# Patient Record
Sex: FEMALE | Race: OTHER | NOT HISPANIC OR LATINO
[De-identification: names, ages, dates, MRNs, and addresses within clinical notes are randomized per-mention and may not be internally consistent; named-entity substitution may affect disease eponyms.]

---

## 2017-01-03 ENCOUNTER — TRANSCRIPTION ENCOUNTER (OUTPATIENT)
Age: 27
End: 2017-01-03

## 2017-11-11 ENCOUNTER — TRANSCRIPTION ENCOUNTER (OUTPATIENT)
Age: 27
End: 2017-11-11

## 2017-12-05 ENCOUNTER — EMERGENCY (EMERGENCY)
Facility: HOSPITAL | Age: 27
LOS: 1 days | Discharge: HOME | End: 2017-12-05
Attending: EMERGENCY MEDICINE | Admitting: EMERGENCY MEDICINE
Payer: OTHER MISCELLANEOUS

## 2017-12-05 VITALS
RESPIRATION RATE: 16 BRPM | TEMPERATURE: 98 F | HEART RATE: 81 BPM | OXYGEN SATURATION: 98 % | SYSTOLIC BLOOD PRESSURE: 129 MMHG | DIASTOLIC BLOOD PRESSURE: 87 MMHG

## 2017-12-05 PROCEDURE — 12001 RPR S/N/AX/GEN/TRNK 2.5CM/<: CPT | Mod: F6

## 2017-12-05 PROCEDURE — 99053 MED SERV 10PM-8AM 24 HR FAC: CPT

## 2017-12-05 PROCEDURE — 99282 EMERGENCY DEPT VISIT SF MDM: CPT | Mod: 25

## 2017-12-05 NOTE — ED PROVIDER NOTE - OBJECTIVE STATEMENT
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 Pt notes the microtome blade she was cut on was clean. She has been able to move the finger and has not noticed any numbness or weakness. She irrigated the wound copiously with her , and was able to apply a clean dressing and achieve relative hemostasis. She is not light headed, has no history of coagulation or immune disorder, and has had a tetanus shot within the past 3 years. No chronic medical conditions, is treated for anxiety and depression.

## 2017-12-05 NOTE — ED ADULT TRIAGE NOTE - CHIEF COMPLAINT QUOTE
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 Pt states blade was clean.

## 2017-12-05 NOTE — ED PROVIDER NOTE - ATTENDING CONTRIBUTION TO CARE
Locurto  pt s/p laceration to volar surface  distal phalanx rt index  nailbed preserved    nl flex/ext of digit   bleeding controlled with pressure  tetanus UTD   wound closed by resident

## 2017-12-05 NOTE — ED PROVIDER NOTE - CHIEF COMPLAINT
The patient is a 27y Female complaining of The patient is a 27y Female complaining of finger laceration

## 2017-12-05 NOTE — ED PROVIDER NOTE - NS ED ROS FT
General: No fevers / chills  HENT: No head trauma, ear pain, runny nose, or sore throat  Eyes: No visual changes  CP: No chest pain, palpitations, or light headedness  Resp: No shortness of breath, no cough  GI: No abdominal pain, diarrhea, constipation, nausea, or vomiting  : No urinary fz, dysuria, or hematuria  Neuro: No numbness, tingling, or weakness  Endo: No hx of diabetes  Heme: No hx of easy bleeding or bruising

## 2017-12-05 NOTE — ED PROVIDER NOTE - PHYSICAL EXAMINATION
Gen: NAD, non-toxic, conversational  Eyes: PERRL, EOMI   HENT: Normocephalic, atraumatic. External ears normal, no rhinorrhea, moist mucous membranes.   CV: Bilateral radial pulses 2+ and symmetric. Right hand w/ cap refill <2s distal to laceration.   MSK: Focal right hand exam: Palmar aspect of second digit with linear laceration ~1 cm in length horizontal from just medial of midline to the lateral aspect of the second finger distal to the DIP joint, ~1.5 mm deep, no nailbed, bone, or tendon involvement.   Resp: speaking without difficulty on room air  Abd: soft, non tender, non rigid, no guarding or rebound tenderness  Skin: dry, wwp   Neuro: AOx3, speech is fluent and appropriate  Psych: Mood good, affect euthymic

## 2017-12-05 NOTE — ED PROVIDER NOTE - CARE PLAN
Principal Discharge DX:	Laceration of finger of right hand without foreign body, initial encounter  Instructions for follow-up, activity and diet:	Have the sutures removed in 7 days, return here to the emergency department or see your primary doctor to have this performed. Keep the area dry and clean. Stay alert for signs of infection as we discussed, such as fevers, chills, increasing pain at the laceration site, abnormal wound drainage, or any other signs of concern; if you develop these please return to the emergency department for repeat evaluation.

## 2017-12-05 NOTE — ED PROVIDER NOTE - MEDICAL DECISION MAKING DETAILS
27F presenting with isolated finger laceration, no other injuries. Finger irrigated and repaired as per procedure note. Sutures out in 7 days, instructions given for return precautions with emphasis on signs of infection and avoiding using the finger. 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 Pt discharged in improved condition.

## 2017-12-05 NOTE — ED PROVIDER NOTE - PLAN OF CARE
Have the sutures removed in 7 days, return here to the emergency department or see your primary doctor to have this performed. Keep the area dry and clean. Stay alert for signs of infection as we discussed, such as fevers, chills, increasing pain at the laceration site, abnormal wound drainage, or any other signs of concern; if you develop these please return to the emergency department for repeat evaluation.

## 2017-12-12 ENCOUNTER — EMERGENCY (EMERGENCY)
Facility: HOSPITAL | Age: 27
LOS: 1 days | Discharge: HOME | End: 2017-12-12
Attending: EMERGENCY MEDICINE | Admitting: EMERGENCY MEDICINE
Payer: OTHER MISCELLANEOUS

## 2017-12-12 VITALS
TEMPERATURE: 98 F | RESPIRATION RATE: 16 BRPM | SYSTOLIC BLOOD PRESSURE: 134 MMHG | HEART RATE: 98 BPM | DIASTOLIC BLOOD PRESSURE: 95 MMHG | OXYGEN SATURATION: 98 %

## 2017-12-12 PROCEDURE — 99053 MED SERV 10PM-8AM 24 HR FAC: CPT

## 2017-12-12 NOTE — ED PROVIDER NOTE - OBJECTIVE STATEMENT
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 Now returning 7 days later for suture removal. No fevers/chills, no drainage, no lymphangitis/streaking, pt notes finger appears well. 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 No loss of sensation, strength, or other issues with the finger. Now returning ~1 week later for suture removal. No fevers/chills, no drainage, no lymphangitis/streaking, pt notes finger appears well.

## 2017-12-12 NOTE — ED PROVIDER NOTE - PLAN OF CARE
Keep the area clean and dry. You may resume activity as tolerated. Return to the emergency department for any fevers, chills, or red streaking from the area.

## 2017-12-12 NOTE — ED PROVIDER NOTE - CARE PLAN
Principal Discharge DX:	Laceration of finger of right hand, subsequent encounter  Instructions for follow-up, activity and diet:	Keep the area clean and dry. You may resume activity as tolerated. Return to the emergency department for any fevers, chills, or red streaking from the area.

## 2017-12-12 NOTE — ED PROVIDER NOTE - MEDICAL DECISION MAKING DETAILS
27yF presenting for suture removal. No complications with suture placement or removal; no signs of infection on exam, appears well healing.

## 2017-12-12 NOTE — ED PROVIDER NOTE - NS ED ROS FT
General: No fevers / chills  HENT: No sore throat  Eyes: No visual changes  CP: No chest pain  Resp: No dyspnea  GI: No abdominal pain  : No urinary complaints  Neuro: No numbness, tingling, or weakness  Endo: No hx of diabetes  Heme: No hx of easy bleeding

## 2017-12-12 NOTE — ED PROVIDER NOTE - PHYSICAL EXAMINATION
Gen: NAD, non-toxic, conversational  Eyes: PERRL, EOMI   HENT: Normocephalic, atraumatic. External ears normal, no rhinorrhea, moist mucous membranes.   CV: RRR, no M/R/G  Resp: CTAB, non-labored, speaking without difficulty on room air  Abd: soft, non tender, non rigid, no guarding or rebound tenderness  Skin: dry, wwp, 5 stitches on horizontal laceration of distal 2nd right finger, well healed appearing, no drainage or open areas of wound.   Neuro: AOx3, speech is fluent and appropriate  Psych: Mood good, affect euthymic

## 2017-12-12 NOTE — ED PROVIDER NOTE - ATTENDING CONTRIBUTION TO CARE
26 yo F here for suture removal. Well healing with no signs of infection on exam. will d/c.  I performed a history and physical exam of the patient and discussed their management with the resident. I reviewed the resident's note and agree with the documented findings and plan of care. My medical decision making and observations are found above.

## 2018-03-14 ENCOUNTER — EMERGENCY (EMERGENCY)
Facility: HOSPITAL | Age: 28
LOS: 1 days | Discharge: HOME | End: 2018-03-14
Attending: EMERGENCY MEDICINE | Admitting: EMERGENCY MEDICINE
Payer: COMMERCIAL

## 2018-03-14 VITALS
TEMPERATURE: 98 F | OXYGEN SATURATION: 97 % | HEART RATE: 93 BPM | RESPIRATION RATE: 16 BRPM | SYSTOLIC BLOOD PRESSURE: 126 MMHG | DIASTOLIC BLOOD PRESSURE: 79 MMHG

## 2018-03-14 LAB
ALBUMIN SERPL ELPH-MCNC: 4.2 G/DL — SIGNIFICANT CHANGE UP (ref 3.3–5)
ALP SERPL-CCNC: 61 U/L — SIGNIFICANT CHANGE UP (ref 40–120)
ALT FLD-CCNC: 11 U/L — SIGNIFICANT CHANGE UP (ref 4–33)
AST SERPL-CCNC: 14 U/L — SIGNIFICANT CHANGE UP (ref 4–32)
BASOPHILS # BLD AUTO: 0.11 K/UL — SIGNIFICANT CHANGE UP (ref 0–0.2)
BASOPHILS NFR BLD AUTO: 0.8 % — SIGNIFICANT CHANGE UP (ref 0–2)
BILIRUB SERPL-MCNC: 0.2 MG/DL — SIGNIFICANT CHANGE UP (ref 0.2–1.2)
BUN SERPL-MCNC: 13 MG/DL — SIGNIFICANT CHANGE UP (ref 7–23)
CALCIUM SERPL-MCNC: 9.1 MG/DL — SIGNIFICANT CHANGE UP (ref 8.4–10.5)
CHLORIDE SERPL-SCNC: 102 MMOL/L — SIGNIFICANT CHANGE UP (ref 98–107)
CO2 SERPL-SCNC: 26 MMOL/L — SIGNIFICANT CHANGE UP (ref 22–31)
CREAT SERPL-MCNC: 0.8 MG/DL — SIGNIFICANT CHANGE UP (ref 0.5–1.3)
EOSINOPHIL # BLD AUTO: 1.24 K/UL — HIGH (ref 0–0.5)
EOSINOPHIL NFR BLD AUTO: 9.4 % — HIGH (ref 0–6)
GLUCOSE SERPL-MCNC: 79 MG/DL — SIGNIFICANT CHANGE UP (ref 70–99)
HCG SERPL-ACNC: < 5 MIU/ML — SIGNIFICANT CHANGE UP
HCT VFR BLD CALC: 44.9 % — SIGNIFICANT CHANGE UP (ref 34.5–45)
HGB BLD-MCNC: 14.6 G/DL — SIGNIFICANT CHANGE UP (ref 11.5–15.5)
IMM GRANULOCYTES # BLD AUTO: 0.05 # — SIGNIFICANT CHANGE UP
IMM GRANULOCYTES NFR BLD AUTO: 0.4 % — SIGNIFICANT CHANGE UP (ref 0–1.5)
LIDOCAIN IGE QN: 30.7 U/L — SIGNIFICANT CHANGE UP (ref 7–60)
LYMPHOCYTES # BLD AUTO: 17.3 % — SIGNIFICANT CHANGE UP (ref 13–44)
LYMPHOCYTES # BLD AUTO: 2.29 K/UL — SIGNIFICANT CHANGE UP (ref 1–3.3)
MCHC RBC-ENTMCNC: 28.2 PG — SIGNIFICANT CHANGE UP (ref 27–34)
MCHC RBC-ENTMCNC: 32.5 % — SIGNIFICANT CHANGE UP (ref 32–36)
MCV RBC AUTO: 86.7 FL — SIGNIFICANT CHANGE UP (ref 80–100)
MONOCYTES # BLD AUTO: 0.61 K/UL — SIGNIFICANT CHANGE UP (ref 0–0.9)
MONOCYTES NFR BLD AUTO: 4.6 % — SIGNIFICANT CHANGE UP (ref 2–14)
NEUTROPHILS # BLD AUTO: 8.95 K/UL — HIGH (ref 1.8–7.4)
NEUTROPHILS NFR BLD AUTO: 67.5 % — SIGNIFICANT CHANGE UP (ref 43–77)
NRBC # FLD: 0 — SIGNIFICANT CHANGE UP
PLATELET # BLD AUTO: 368 K/UL — SIGNIFICANT CHANGE UP (ref 150–400)
PMV BLD: 9.9 FL — SIGNIFICANT CHANGE UP (ref 7–13)
POTASSIUM SERPL-MCNC: 4 MMOL/L — SIGNIFICANT CHANGE UP (ref 3.5–5.3)
POTASSIUM SERPL-SCNC: 4 MMOL/L — SIGNIFICANT CHANGE UP (ref 3.5–5.3)
PROT SERPL-MCNC: 7.3 G/DL — SIGNIFICANT CHANGE UP (ref 6–8.3)
RBC # BLD: 5.18 M/UL — SIGNIFICANT CHANGE UP (ref 3.8–5.2)
RBC # FLD: 12.1 % — SIGNIFICANT CHANGE UP (ref 10.3–14.5)
SODIUM SERPL-SCNC: 141 MMOL/L — SIGNIFICANT CHANGE UP (ref 135–145)
WBC # BLD: 13.25 K/UL — HIGH (ref 3.8–10.5)
WBC # FLD AUTO: 13.25 K/UL — HIGH (ref 3.8–10.5)

## 2018-03-14 PROCEDURE — 99284 EMERGENCY DEPT VISIT MOD MDM: CPT

## 2018-03-14 PROCEDURE — 76705 ECHO EXAM OF ABDOMEN: CPT | Mod: 26

## 2018-03-14 RX ORDER — FAMOTIDINE 10 MG/ML
20 INJECTION INTRAVENOUS ONCE
Qty: 0 | Refills: 0 | Status: COMPLETED | OUTPATIENT
Start: 2018-03-14 | End: 2018-03-14

## 2018-03-14 RX ADMIN — FAMOTIDINE 20 MILLIGRAM(S): 10 INJECTION INTRAVENOUS at 12:07

## 2018-03-14 RX ADMIN — Medication 30 MILLILITER(S): at 12:07

## 2018-03-14 NOTE — ED ADULT TRIAGE NOTE - CHIEF COMPLAINT QUOTE
Pt. c/o mid-abdominal pain radiating to back x 4 days with nausea. Went to PCP on Monday who said it was probably gastritis. Given script for US but hasn't gone yet. Denies any vomiting, diarrhea or fevers. PMHx asthma, anxiety

## 2018-03-14 NOTE — ED PROVIDER NOTE - OBJECTIVE STATEMENT
Patient is a 28 year old female with no PMH presenting with epigastric pain. Symptoms began 3-4 days ago. She notes pain stays in epigastrium and feels like bloating as well. Occasionally radiates to back. No n/v/d/black or bloody stools. No trauma, fevers, etoh use. Saw PMD when symptoms began who ordered outpatient RUQ sono (not yet performed) and tums. + family history of pancreatitis. No chest pain, sob, focal weakness or numbness.

## 2018-03-14 NOTE — ED PROVIDER NOTE - MEDICAL DECISION MAKING DETAILS
pt presenting with epigastric pain, occasionally radiating to back. Mild epigastric ttp on exam. will obtain  labs, lipase, Gi cocktail, ruq sono. likely dc home if unremarkable workup

## 2018-09-07 ENCOUNTER — TRANSCRIPTION ENCOUNTER (OUTPATIENT)
Age: 28
End: 2018-09-07

## 2018-10-31 ENCOUNTER — TRANSCRIPTION ENCOUNTER (OUTPATIENT)
Age: 28
End: 2018-10-31

## 2018-11-02 ENCOUNTER — TRANSCRIPTION ENCOUNTER (OUTPATIENT)
Age: 28
End: 2018-11-02

## 2018-12-11 ENCOUNTER — TRANSCRIPTION ENCOUNTER (OUTPATIENT)
Age: 28
End: 2018-12-11

## 2019-03-29 ENCOUNTER — TRANSCRIPTION ENCOUNTER (OUTPATIENT)
Age: 29
End: 2019-03-29

## 2019-04-07 ENCOUNTER — EMERGENCY (EMERGENCY)
Facility: HOSPITAL | Age: 29
LOS: 1 days | Discharge: HOME | End: 2019-04-07
Attending: STUDENT IN AN ORGANIZED HEALTH CARE EDUCATION/TRAINING PROGRAM
Payer: COMMERCIAL

## 2019-04-07 VITALS
TEMPERATURE: 98 F | HEIGHT: 68 IN | SYSTOLIC BLOOD PRESSURE: 112 MMHG | OXYGEN SATURATION: 100 % | DIASTOLIC BLOOD PRESSURE: 71 MMHG | HEART RATE: 76 BPM | WEIGHT: 154.98 LBS | RESPIRATION RATE: 16 BRPM

## 2019-04-07 PROCEDURE — 99284 EMERGENCY DEPT VISIT MOD MDM: CPT | Mod: 25

## 2019-04-07 PROCEDURE — 71046 X-RAY EXAM CHEST 2 VIEWS: CPT

## 2019-04-07 PROCEDURE — 71046 X-RAY EXAM CHEST 2 VIEWS: CPT | Mod: 26

## 2019-04-07 RX ORDER — QUETIAPINE FUMARATE 200 MG/1
0 TABLET, FILM COATED ORAL
Qty: 0 | Refills: 0 | COMMUNITY

## 2019-04-07 RX ORDER — CYCLOBENZAPRINE HYDROCHLORIDE 10 MG/1
1 TABLET, FILM COATED ORAL
Qty: 10 | Refills: 0 | OUTPATIENT
Start: 2019-04-07

## 2019-04-07 RX ORDER — LORATADINE 10 MG/1
0 TABLET ORAL
Qty: 0 | Refills: 0 | COMMUNITY

## 2019-04-07 RX ORDER — PANTOPRAZOLE SODIUM 20 MG/1
0 TABLET, DELAYED RELEASE ORAL
Qty: 0 | Refills: 0 | COMMUNITY

## 2019-04-07 RX ORDER — ACETAMINOPHEN 500 MG
650 TABLET ORAL ONCE
Qty: 0 | Refills: 0 | Status: COMPLETED | OUTPATIENT
Start: 2019-04-07 | End: 2019-04-07

## 2019-04-07 RX ORDER — CYCLOBENZAPRINE HYDROCHLORIDE 10 MG/1
10 TABLET, FILM COATED ORAL ONCE
Qty: 0 | Refills: 0 | Status: COMPLETED | OUTPATIENT
Start: 2019-04-07 | End: 2019-04-07

## 2019-04-07 RX ORDER — SERTRALINE 25 MG/1
0 TABLET, FILM COATED ORAL
Qty: 0 | Refills: 0 | COMMUNITY

## 2019-04-07 RX ORDER — ALBUTEROL 90 UG/1
0 AEROSOL, METERED ORAL
Qty: 0 | Refills: 0 | COMMUNITY

## 2019-04-07 RX ADMIN — CYCLOBENZAPRINE HYDROCHLORIDE 10 MILLIGRAM(S): 10 TABLET, FILM COATED ORAL at 04:08

## 2019-04-07 RX ADMIN — Medication 650 MILLIGRAM(S): at 04:08

## 2019-04-07 NOTE — ED PROVIDER NOTE - CLINICAL SUMMARY MEDICAL DECISION MAKING FREE TEXT BOX
patient vital stable. neuro intact. no focal deficit. no midline back pain. no fever, neuro deficit, iv drug use. likely msk pain, will treat pain dougie

## 2019-04-07 NOTE — ED PROVIDER NOTE - OBJECTIVE STATEMENT
29 y.o presenting with intermittent back pain x 1 week. endorses 29 y.o presenting with intermittent back pain x 1 week. endorses pain to upper back. endorses worse with movement. denies fever, chills, midline back pain, weakness, numbness, cp, sob.

## 2019-09-25 ENCOUNTER — TRANSCRIPTION ENCOUNTER (OUTPATIENT)
Age: 29
End: 2019-09-25

## 2019-11-27 ENCOUNTER — TRANSCRIPTION ENCOUNTER (OUTPATIENT)
Age: 29
End: 2019-11-27

## 2020-04-07 ENCOUNTER — TRANSCRIPTION ENCOUNTER (OUTPATIENT)
Age: 30
End: 2020-04-07

## 2020-05-29 NOTE — ED ADULT NURSE NOTE - CINV DISCH MEDS REVIEWED YN
Addressed pt's concerns today and rationale explained, collaboratively discussed with pt how to proceed and reached a solution that pt feels will work best for her; pt expresses understanding and no further questions. thanks   Yes

## 2023-11-01 PROBLEM — Z00.00 ENCOUNTER FOR PREVENTIVE HEALTH EXAMINATION: Status: ACTIVE | Noted: 2023-11-01

## 2023-11-02 PROBLEM — F32.9 MAJOR DEPRESSIVE DISORDER, SINGLE EPISODE, UNSPECIFIED: Chronic | Status: ACTIVE | Noted: 2019-04-07

## 2023-11-02 PROBLEM — J45.909 UNSPECIFIED ASTHMA, UNCOMPLICATED: Chronic | Status: ACTIVE | Noted: 2019-04-07

## 2023-11-02 PROBLEM — K21.9 GASTRO-ESOPHAGEAL REFLUX DISEASE WITHOUT ESOPHAGITIS: Chronic | Status: ACTIVE | Noted: 2019-04-07

## 2023-11-02 PROBLEM — F41.9 ANXIETY DISORDER, UNSPECIFIED: Chronic | Status: ACTIVE | Noted: 2019-04-07

## 2023-11-06 ENCOUNTER — APPOINTMENT (OUTPATIENT)
Dept: INTERNAL MEDICINE | Facility: CLINIC | Age: 33
End: 2023-11-06

## 2023-11-06 ENCOUNTER — TRANSCRIPTION ENCOUNTER (OUTPATIENT)
Age: 33
End: 2023-11-06

## 2024-03-27 ENCOUNTER — NON-APPOINTMENT (OUTPATIENT)
Age: 34
End: 2024-03-27

## 2024-04-18 ENCOUNTER — APPOINTMENT (OUTPATIENT)
Dept: PULMONOLOGY | Facility: CLINIC | Age: 34
End: 2024-04-18
Payer: COMMERCIAL

## 2024-04-18 ENCOUNTER — LABORATORY RESULT (OUTPATIENT)
Age: 34
End: 2024-04-18

## 2024-04-18 VITALS
SYSTOLIC BLOOD PRESSURE: 128 MMHG | HEIGHT: 68 IN | HEART RATE: 107 BPM | OXYGEN SATURATION: 97 % | WEIGHT: 150 LBS | RESPIRATION RATE: 12 BRPM | BODY MASS INDEX: 22.73 KG/M2 | TEMPERATURE: 97.5 F | DIASTOLIC BLOOD PRESSURE: 83 MMHG

## 2024-04-18 DIAGNOSIS — F32.A ANXIETY DISORDER, UNSPECIFIED: ICD-10-CM

## 2024-04-18 DIAGNOSIS — Z82.5 FAMILY HISTORY OF ASTHMA AND OTHER CHRONIC LOWER RESPIRATORY DISEASES: ICD-10-CM

## 2024-04-18 DIAGNOSIS — F41.9 ANXIETY DISORDER, UNSPECIFIED: ICD-10-CM

## 2024-04-18 DIAGNOSIS — J45.30 MILD PERSISTENT ASTHMA, UNCOMPLICATED: ICD-10-CM

## 2024-04-18 DIAGNOSIS — R09.82 POSTNASAL DRIP: ICD-10-CM

## 2024-04-18 DIAGNOSIS — K21.9 GASTRO-ESOPHAGEAL REFLUX DISEASE W/OUT ESOPHAGITIS: ICD-10-CM

## 2024-04-18 PROCEDURE — 99203 OFFICE O/P NEW LOW 30 MIN: CPT

## 2024-04-18 RX ORDER — FAMOTIDINE 20 MG/1
20 TABLET, FILM COATED ORAL TWICE DAILY
Refills: 0 | Status: ACTIVE | COMMUNITY
Start: 2024-04-18

## 2024-04-18 RX ORDER — QUETIAPINE 100 MG/1
100 TABLET, FILM COATED ORAL
Refills: 0 | Status: ACTIVE | COMMUNITY
Start: 2024-04-18

## 2024-04-18 RX ORDER — BUDESONIDE AND FORMOTEROL FUMARATE DIHYDRATE 80; 4.5 UG/1; UG/1
80-4.5 AEROSOL RESPIRATORY (INHALATION)
Qty: 1 | Refills: 5 | Status: ACTIVE | COMMUNITY
Start: 2024-04-18 | End: 1900-01-01

## 2024-04-18 RX ORDER — ALBUTEROL SULFATE 90 UG/1
108 (90 BASE) INHALANT RESPIRATORY (INHALATION)
Qty: 1 | Refills: 3 | Status: ACTIVE | COMMUNITY
Start: 2024-04-18 | End: 1900-01-01

## 2024-04-18 RX ORDER — AZELASTINE HYDROCHLORIDE 137 UG/1
137 SPRAY, METERED NASAL TWICE DAILY
Qty: 1 | Refills: 3 | Status: ACTIVE | COMMUNITY
Start: 2024-04-18 | End: 1900-01-01

## 2024-04-18 RX ORDER — SERTRALINE HYDROCHLORIDE 100 MG/1
100 TABLET, FILM COATED ORAL DAILY
Refills: 0 | Status: ACTIVE | COMMUNITY
Start: 2024-04-18

## 2024-04-18 RX ORDER — ALBUTEROL SULFATE 1.25 MG/3ML
1.25 SOLUTION RESPIRATORY (INHALATION)
Qty: 1 | Refills: 5 | Status: ACTIVE | COMMUNITY
Start: 2024-04-18 | End: 1900-01-01

## 2024-04-18 NOTE — ASSESSMENT
[FreeTextEntry1] : Pt is a 34year old female nonsmoker with medical history of eosinophilic asthma, anxiety, depression, GERD, hiatal hernia . Pt is here for the management of asthma and to establish care.    PEF in office 300. Advised to keep log record for twice a day  Will start Symbicort 80 1-2 puffs as needed, will do albuterol nebs everday for once a week. Medication sent to [facility] Requested to do PFT for lung function capacity.   Repeat asthma profile, CBC+diff, IgE  O/V after test.

## 2024-04-18 NOTE — HISTORY OF PRESENT ILLNESS
[Never] : never [TextBox_4] : Pt is a 34year old female nonsmoker with medical history of eosinophilic asthma, anxiety, depression, GERD, hiatal hernia . Pt is here for the management of asthma and to establish care.   Recently went to the urgent care 03/28 for fever, cough. Recent exposure to covid positive. Prescribed Medrol pack, did not take mucinex and Flonase did not help. Still experiencing productive cough with yellow phlegm, nasal drip. Cough is brought on by exertion, activity and talking. Especially worse when laying flat. She admits to seasonal allergies and had allergy testing in the past. (cat dander, pollen, mold). Currently doing albuterol nebs once a week, claritin and albuterol HFA three times in the past week. Pt denies chest pain, fever, nausea, vomiting.   She is unable to do much activity feels fatigue quickly. Currently able to ambulate a block only.  She used to see Dr. Lowe Pulmonologist  ([phone]). Records scanned in the chart  Had covid in August 2023 and 2020. Pt did not get hospitalized.   Social hx  Works at a [employer] dept Has 1 dog

## 2024-04-18 NOTE — REVIEW OF SYSTEMS
[Nasal Congestion] : nasal congestion [Postnasal Drip] : postnasal drip [Sinus Problems] : sinus problems [Cough] : cough [SOB on Exertion] : sob on exertion [Seasonal Allergies] : seasonal allergies [GERD] : gerd [Negative] : Endocrine [Hemoptysis] : no hemoptysis [Sputum] : no sputum [Dyspnea] : no dyspnea

## 2024-04-18 NOTE — PHYSICAL EXAM
[No Acute Distress] : no acute distress [Normal Oropharynx] : normal oropharynx [Normal Appearance] : normal appearance [Normal Rate/Rhythm] : normal rate/rhythm [No Abnormalities] : no abnormalities [Benign] : benign [Normal Gait] : normal gait [No Clubbing] : no clubbing [Normal Color/ Pigmentation] : normal color/ pigmentation [No Focal Deficits] : no focal deficits [Oriented x3] : oriented x3 [TextBox_68] : slight wheezing on bases

## 2024-04-19 LAB
BASOPHILS # BLD AUTO: 0.09 K/UL
BASOPHILS NFR BLD AUTO: 0.9 %
EOSINOPHIL # BLD AUTO: 0.97 K/UL
EOSINOPHIL NFR BLD AUTO: 9.6 %
HCT VFR BLD CALC: 43.9 %
HGB BLD-MCNC: 13.8 G/DL
IMM GRANULOCYTES NFR BLD AUTO: 0.3 %
LYMPHOCYTES # BLD AUTO: 1.99 K/UL
LYMPHOCYTES NFR BLD AUTO: 19.6 %
MAN DIFF?: NORMAL
MCHC RBC-ENTMCNC: 29.1 PG
MCHC RBC-ENTMCNC: 31.4 GM/DL
MCV RBC AUTO: 92.6 FL
MONOCYTES # BLD AUTO: 0.61 K/UL
MONOCYTES NFR BLD AUTO: 6 %
NEUTROPHILS # BLD AUTO: 6.44 K/UL
NEUTROPHILS NFR BLD AUTO: 63.6 %
PLATELET # BLD AUTO: 344 K/UL
RBC # BLD: 4.74 M/UL
RBC # FLD: 12.5 %
WBC # FLD AUTO: 10.13 K/UL

## 2024-04-20 LAB — TOTAL IGE SMQN RAST: 271 KU/L

## 2024-04-22 LAB
A ALTERNATA IGE QN: <0.1 KUA/L
A FUMIGATUS IGE QN: <0.1 KUA/L
C ALBICANS IGE QN: <0.1 KUA/L
C HERBARUM IGE QN: <0.1 KUA/L
CAT DANDER IGE QN: 3.47 KUA/L
COMMON RAGWEED IGE QN: <0.1 KUA/L
D FARINAE IGE QN: 13.5 KUA/L
D PTERONYSS IGE QN: 12.8 KUA/L
DEPRECATED A ALTERNATA IGE RAST QL: 0 (ref 0–?)
DEPRECATED A FUMIGATUS IGE RAST QL: 0 (ref 0–?)
DEPRECATED C ALBICANS IGE RAST QL: 0
DEPRECATED C HERBARUM IGE RAST QL: 0 (ref 0–?)
DEPRECATED CAT DANDER IGE RAST QL: 2 (ref 0–?)
DEPRECATED COMMON RAGWEED IGE RAST QL: 0 (ref 0–?)
DEPRECATED D FARINAE IGE RAST QL: 3 (ref 0–?)
DEPRECATED D PTERONYSS IGE RAST QL: 3 (ref 0–?)
DEPRECATED DOG DANDER IGE RAST QL: 3 (ref 0–?)
DEPRECATED M RACEMOSUS IGE RAST QL: 0
DEPRECATED ROACH IGE RAST QL: 0 (ref 0–?)
DEPRECATED TIMOTHY IGE RAST QL: 2 (ref 0–?)
DEPRECATED WHITE OAK IGE RAST QL: 0 (ref 0–?)
DOG DANDER IGE QN: 11.2 KUA/L
M RACEMOSUS IGE QN: <0.1 KUA/L
ROACH IGE QN: <0.1 KUA/L
TIMOTHY IGE QN: 2.05 KUA/L
WHITE OAK IGE QN: <0.1 KUA/L

## 2024-05-06 ENCOUNTER — APPOINTMENT (OUTPATIENT)
Dept: PRIMARY CARE | Facility: CLINIC | Age: 34
End: 2024-05-06

## 2024-06-27 ENCOUNTER — OUTPATIENT (OUTPATIENT)
Dept: OUTPATIENT SERVICES | Facility: HOSPITAL | Age: 34
LOS: 1 days | End: 2024-06-27
Payer: COMMERCIAL

## 2024-06-27 DIAGNOSIS — J45.30 MILD PERSISTENT ASTHMA, UNCOMPLICATED: ICD-10-CM

## 2024-06-27 PROCEDURE — 94010 BREATHING CAPACITY TEST: CPT | Mod: 26

## 2024-06-27 PROCEDURE — 94726 PLETHYSMOGRAPHY LUNG VOLUMES: CPT

## 2024-06-27 PROCEDURE — 94760 N-INVAS EAR/PLS OXIMETRY 1: CPT

## 2024-06-27 PROCEDURE — 94729 DIFFUSING CAPACITY: CPT | Mod: 26

## 2024-06-27 PROCEDURE — 94060 EVALUATION OF WHEEZING: CPT

## 2024-06-27 PROCEDURE — 94726 PLETHYSMOGRAPHY LUNG VOLUMES: CPT | Mod: 26

## 2024-06-27 PROCEDURE — 94729 DIFFUSING CAPACITY: CPT

## 2024-07-18 ENCOUNTER — APPOINTMENT (OUTPATIENT)
Dept: PULMONOLOGY | Facility: CLINIC | Age: 34
End: 2024-07-18
Payer: COMMERCIAL

## 2024-07-18 VITALS
HEART RATE: 88 BPM | BODY MASS INDEX: 24.25 KG/M2 | DIASTOLIC BLOOD PRESSURE: 75 MMHG | RESPIRATION RATE: 12 BRPM | HEIGHT: 68 IN | SYSTOLIC BLOOD PRESSURE: 111 MMHG | OXYGEN SATURATION: 97 % | TEMPERATURE: 97.7 F | WEIGHT: 160 LBS

## 2024-07-18 DIAGNOSIS — J45.50 SEVERE PERSISTENT ASTHMA, UNCOMPLICATED: ICD-10-CM

## 2024-07-18 PROCEDURE — 99213 OFFICE O/P EST LOW 20 MIN: CPT

## 2024-07-22 RX ORDER — BENRALIZUMAB 30 MG/ML
30 INJECTION, SOLUTION SUBCUTANEOUS
Qty: 1 | Refills: 2 | Status: ACTIVE | COMMUNITY
Start: 2024-07-18 | End: 1900-01-01

## 2024-07-22 RX ORDER — BENRALIZUMAB 30 MG/ML
30 INJECTION, SOLUTION SUBCUTANEOUS
Qty: 1 | Refills: 1 | Status: ACTIVE | COMMUNITY
Start: 2024-07-18 | End: 1900-01-01

## 2024-07-24 ENCOUNTER — RX RENEWAL (OUTPATIENT)
Age: 34
End: 2024-07-24

## 2024-07-24 RX ORDER — BUDESONIDE AND FORMOTEROL FUMARATE DIHYDRATE 80; 4.5 UG/1; UG/1
80-4.5 AEROSOL RESPIRATORY (INHALATION) TWICE DAILY
Qty: 30.6 | Refills: 2 | Status: ACTIVE | COMMUNITY
Start: 2024-07-24 | End: 1900-01-01

## 2024-10-24 ENCOUNTER — APPOINTMENT (OUTPATIENT)
Dept: PULMONOLOGY | Facility: CLINIC | Age: 34
End: 2024-10-24

## 2025-04-25 ENCOUNTER — NON-APPOINTMENT (OUTPATIENT)
Age: 35
End: 2025-04-25

## 2025-05-12 ENCOUNTER — APPOINTMENT (OUTPATIENT)
Dept: PULMONOLOGY | Facility: CLINIC | Age: 35
End: 2025-05-12
Payer: COMMERCIAL

## 2025-05-12 ENCOUNTER — NON-APPOINTMENT (OUTPATIENT)
Age: 35
End: 2025-05-12

## 2025-05-12 VITALS
TEMPERATURE: 97.5 F | HEIGHT: 68 IN | RESPIRATION RATE: 13 BRPM | BODY MASS INDEX: 27.13 KG/M2 | HEART RATE: 107 BPM | SYSTOLIC BLOOD PRESSURE: 114 MMHG | DIASTOLIC BLOOD PRESSURE: 83 MMHG | WEIGHT: 179 LBS | OXYGEN SATURATION: 95 %

## 2025-05-12 PROCEDURE — 99213 OFFICE O/P EST LOW 20 MIN: CPT

## 2025-05-30 RX ORDER — DUPILUMAB 300 MG/2ML
300 INJECTION, SOLUTION SUBCUTANEOUS
Qty: 1 | Refills: 3 | Status: ACTIVE | COMMUNITY
Start: 2025-05-28 | End: 1900-01-01

## 2025-06-02 RX ORDER — DUPILUMAB 300 MG/2ML
300 INJECTION, SOLUTION SUBCUTANEOUS
Qty: 1 | Refills: 0 | Status: ACTIVE | COMMUNITY
Start: 2025-05-28 | End: 1900-01-01

## 2025-06-06 ENCOUNTER — NON-APPOINTMENT (OUTPATIENT)
Age: 35
End: 2025-06-06

## 2025-06-10 ENCOUNTER — NON-APPOINTMENT (OUTPATIENT)
Age: 35
End: 2025-06-10

## 2025-08-07 ENCOUNTER — NON-APPOINTMENT (OUTPATIENT)
Age: 35
End: 2025-08-07

## 2025-08-11 ENCOUNTER — APPOINTMENT (OUTPATIENT)
Dept: PULMONOLOGY | Facility: CLINIC | Age: 35
End: 2025-08-11
Payer: COMMERCIAL

## 2025-08-11 VITALS
OXYGEN SATURATION: 97 % | RESPIRATION RATE: 12 BRPM | HEIGHT: 68 IN | TEMPERATURE: 97.6 F | HEART RATE: 106 BPM | DIASTOLIC BLOOD PRESSURE: 68 MMHG | WEIGHT: 177 LBS | BODY MASS INDEX: 26.83 KG/M2 | SYSTOLIC BLOOD PRESSURE: 103 MMHG

## 2025-08-11 DIAGNOSIS — J02.9 ACUTE PHARYNGITIS, UNSPECIFIED: ICD-10-CM

## 2025-08-11 PROCEDURE — 99214 OFFICE O/P EST MOD 30 MIN: CPT

## 2025-08-12 ENCOUNTER — NON-APPOINTMENT (OUTPATIENT)
Age: 35
End: 2025-08-12

## 2025-08-12 LAB — S PYO DNA THROAT QL NAA+PROBE: NOT DETECTED

## 2025-08-13 ENCOUNTER — TRANSCRIPTION ENCOUNTER (OUTPATIENT)
Age: 35
End: 2025-08-13

## 2025-08-25 ENCOUNTER — APPOINTMENT (OUTPATIENT)
Dept: PULMONOLOGY | Facility: CLINIC | Age: 35
End: 2025-08-25

## 2025-09-17 ENCOUNTER — RX RENEWAL (OUTPATIENT)
Age: 35
End: 2025-09-17